# Patient Record
Sex: FEMALE | Race: BLACK OR AFRICAN AMERICAN
[De-identification: names, ages, dates, MRNs, and addresses within clinical notes are randomized per-mention and may not be internally consistent; named-entity substitution may affect disease eponyms.]

---

## 2017-04-11 ENCOUNTER — HOSPITAL ENCOUNTER (OUTPATIENT)
Dept: HOSPITAL 62 - WI | Age: 57
End: 2017-04-11
Attending: INTERNAL MEDICINE
Payer: MEDICARE

## 2017-04-11 DIAGNOSIS — Z12.31: Primary | ICD-10-CM

## 2017-04-11 PROCEDURE — G0202 SCR MAMMO BI INCL CAD: HCPCS

## 2017-04-11 PROCEDURE — 77067 SCR MAMMO BI INCL CAD: CPT

## 2018-06-15 ENCOUNTER — HOSPITAL ENCOUNTER (OUTPATIENT)
Dept: HOSPITAL 62 - WI | Age: 58
End: 2018-06-15
Attending: INTERNAL MEDICINE
Payer: MEDICARE

## 2018-06-15 DIAGNOSIS — Z12.31: Primary | ICD-10-CM

## 2018-06-15 PROCEDURE — 77067 SCR MAMMO BI INCL CAD: CPT

## 2018-06-15 PROCEDURE — 77063 BREAST TOMOSYNTHESIS BI: CPT

## 2018-06-15 NOTE — WOMENS IMAGING REPORT
EXAM DESCRIPTION:  3D SCREENING MAMMO BILAT



COMPLETED DATE/TIME:  6/15/2018 8:51 am



REASON FOR STUDY:  ROUTINE SCREENING;Z12.31 Z12.31  ENCNTR SCREEN MAMMOGRAM FOR MALIGNANT NEOPLASM OF
 BETHANY



COMPARISON:  Multiple since 2013



TECHNIQUE:  Standard craniocaudal and mediolateral oblique views of each breast recorded using digita
l acquisition and breast tomosynthesis.



LIMITATIONS:  None.



FINDINGS:  Findings present which are benign by mammographic criteria.  No suspicious masses, calcifi
cations or architectural distortion.

Pertinent benign findings: Benign left breast parenchymal calcifications

Read with the assistance of CAD.

.[facility] - R2 [employer] Version 1.3

.[facility] - R2 Cenova Version 1.3

.[facility] - R2 Cenova Version 2.4

.[facility] - R2 Cenova Version 2.4

.[facility] - R2  Version 9.2

Benign mammographic findings may include one or more of the following:  Smooth masses, popcorn/rim/co
arse calcifications, asymmetries, post-procedure changes, and lesions with long-standing stability.



IMPRESSION:  BENIGN MAMMOGRAPHIC FINDINGS.  BIRADS 2



BREAST DENSITY:  b. There are scattered areas of fibroglandular density.



BIRAD:  2 BENIGN FINDING(S)



RECOMMENDATION:  RECOMMENDATION: ROUTINE SCREENING

Please continue yearly bilateral screening tomosynthesis in June 2019



COMMENT:  The patient has been notified of the results by letter per SA requirements. Additional no
tification policies are in place for contacting patient with suspicious or incomplete findings.

Quality ID #225: The American College of Radiology recommends an annual screening mammogram for women
 aged 40 years or over. This facility utilizes a reminder system to ensure that all patients receive 
reminder letters, and/or direct phone calls for appointments. This includes reminders for routine scr
eening mammograms, diagnostic mammograms, or other Breast Imaging Interventions when appropriate.  Th
is patient will be placed in the appropriate reminder system.

The American College of Radiology (ACR) has developed recommendations for screening MRI of the breast
s in certain patient populations, to be used in conjunction with mammography.  Breast MRI surveillanc
e may be appropriate for women with more than 20% lifetime risk of developing breast cancer  as deter
mined by genetic testing, significant family history of the disease, or history of mantle radiation f
or Hodgkins Disease.  ACR Practice Guidelines 2008.

[employer]



PQRS 6045F: Fluoroscopic imaging is not utilized for breast tomosynthesis.



TECHNICAL DOCUMENTATION:  FINDING NUMBER: (1)

ASSESSMENT: (1)

JOB ID:  [id number]

 2011 [employer]- All Rights Reserved



Reading location - IP/workstation name: [email or web address]

## 2019-12-11 ENCOUNTER — HOSPITAL ENCOUNTER (OUTPATIENT)
Dept: HOSPITAL 62 - WI | Age: 59
End: 2019-12-11
Attending: INTERNAL MEDICINE
Payer: MEDICARE

## 2019-12-11 DIAGNOSIS — Z12.31: Primary | ICD-10-CM

## 2019-12-11 DIAGNOSIS — N64.89: ICD-10-CM

## 2019-12-11 PROCEDURE — 77063 BREAST TOMOSYNTHESIS BI: CPT

## 2019-12-11 PROCEDURE — 77067 SCR MAMMO BI INCL CAD: CPT

## 2019-12-11 NOTE — WOMENS IMAGING REPORT
EXAM DESCRIPTION:  3D SCREENING MAMMO BILAT



COMPLETED DATE/TIME:  12/11/2019 9:39 am



REASON FOR STUDY:  Z12.31 SCREENING MAMMO Z12.31  ENCNTR SCREEN MAMMOGRAM FOR MALIGNANT NEOPLASM OF B
RE



COMPARISON:  [id number]



EXAM PARAMETERS:  Standard craniocaudal and mediolateral oblique views of each breast recorded using 
digital acquisition and breast tomosynthesis.

Read with the assistance of CAD.

.[facility] - [employer]  Version 9.2



LIMITATIONS:  None.



FINDINGS:  RIGHT BREAST

MASSES: No suspicious masses.

CALCIFICATIONS: No new or suspicious calcifications.

ARCHITECTURAL DISTORTION: None.

ASYMMETRY: None noted.

OTHER: No other significant findings.

LEFT BREAST

MASSES: No suspicious masses.

CALCIFICATIONS: No new or suspicious calcifications.

ARCHITECTURAL DISTORTION: None.

ASYMMETRY: Focal asymmetry lower inner quadrant about 7.5 cm deep to the nipple.

OTHER: No other significant findings.



IMPRESSION:  Focal asymmetry left breast.

0 Incomplete: Needs Additional Imaging Evaluation and/or prior Mammograms for Comparison.



BREAST DENSITY:  b. There are scattered areas of fibroglandular density.



BIRAD:  ASSESSMENT:  0 Incomplete:  Needs Additional Imaging Evaluation and/or prior Mammograms for C
omparison.



RECOMMENDATION:  RECOMMENDED FOLLOW-UP: Cone compression views and potential ultrasound left breast.

The patient will be contacted for additional imaging.



COMMENT:  The patient has been notified of the results by letter per MQSA requirements. Additional no
tification policies are in place for contacting patient with suspicious or incomplete findings.

Quality ID #225: The American College of Radiology recommends an annual screening mammogram for women
 aged 40 years or over. This facility utilizes a reminder system to ensure that all patients receive 
reminder letters, and/or direct phone calls for appointments. This includes reminders for routine scr
eening mammograms, diagnostic mammograms, or other Breast Imaging Interventions when appropriate.  Th
is patient will be placed in the appropriate reminder system.



TECHNICAL DOCUMENTATION:  FINDING NUMBER: (1)

ASSESSMENT: (1)

JOB ID:  [id number]

 2011 [employer]- All Rights Reserved



Reading location - IP/workstation name: [email or web address]

## 2019-12-23 ENCOUNTER — HOSPITAL ENCOUNTER (OUTPATIENT)
Dept: HOSPITAL 62 - WI | Age: 59
End: 2019-12-23
Attending: INTERNAL MEDICINE
Payer: MEDICARE

## 2019-12-23 DIAGNOSIS — N63.24: Primary | ICD-10-CM

## 2019-12-23 PROCEDURE — 76642 ULTRASOUND BREAST LIMITED: CPT

## 2019-12-23 PROCEDURE — 77065 DX MAMMO INCL CAD UNI: CPT

## 2019-12-23 NOTE — WOMENS IMAGING REPORT
EXAM DESCRIPTION:  3D DX MAMMO LEFT UNILAT; U/S BREAST UNILAT LIMITED



COMPLETED DATE/TIME:  12/23/2019 11:39 am; 12/23/2019 12:06 pm



REASON FOR STUDY:  R92.2 INCONCLUSIVE MAMMOGRAM; R92.2 LT BREAST R92.2  INCONCLUSIVE MAMMOGRAM



COMPARISON:  Multiple since 2015



EXAM PARAMETERS:  Cone compression craniocaudal and mediolateral oblique images of the breast recorde
d using digital acquisition and breast tomosynthesis.  Additional left breast 90 mediolateral view.

Left breast and axilla ultrasound was performed.

Read with the assistance of CAD.

.[facility] - [facility]  Version 9.2



LIMITATIONS:  None.



FINDINGS:  BREAST LATERALITY: left

MASSES: In the lower inner quadrant left breast about 5 cm from the nipple, a subcentimeter mammograp
hic mass is present with partial border loss.  This was subsequently shown at ultrasound to represent
 a small solid hypoechoic mass 4 mm in size indeterminate for malignancy.

CALCIFICATIONS: No new or suspicious calcifications.

ARCHITECTURAL DISTORTION: None.

ASYMMETRY: None noted.

OTHER: No other significant findings.

Left breast and axilla ultrasound:

Ultrasound of the lower inner quadrant left breast demonstrates a solid hypoechoic 4 mm by 6 mm mammo
graphic nodule, taller than wide, with internal color flow.  This is worrisome for a small malignant 
nodule.  Ultrasound-guided core biopsy, post biopsy clip placement and immediate follow-up two-view m
ammogram recommended.



IMPRESSION:  New solid mass lower inner quadrant left breast 6 x 4 mm in size indeterminate for malig
romi.  Biopsy recommended (BI-RADS 4).



BREAST DENSITY:  b. There are scattered areas of fibroglandular density.



BIRAD:  ASSESSMENT:  4 Suspicious. Biopsy should be performed in the absence of clinical contra-indic
ation.



RECOMMENDATION:  RECOMMENDED FOLLOW UP: Ultrasound-guided core biopsy left breast, post biopsy clip p
lacement with immediate follow-up two-view mammogram

SPECIFIC INTERVENTION/IMAGING/CONSULTATION RECOMMENDED:Ultrasound-guided core biopsy left breast, pos
t biopsy clip placement with immediate follow-up two-view mammogram

COMMUNICATION:Patient notified by letter.  These results were not discussed directly with the patient




COMMENT:  The patient has been notified of the results by letter per MQSA requirements. Additional no
tification policies are in place for contacting patient with suspicious or incomplete findings.

Quality ID #225: The American College of Radiology recommends an annual screening mammogram for women
 aged 40 years or over. This facility utilizes a reminder system to ensure that all patients receive 
reminder letters, and/or direct phone calls for appointments. This includes reminders for routine scr
eening mammograms, diagnostic mammograms, or other Breast Imaging Interventions when appropriate.  Th
is patient will be placed in the appropriate reminder system.



TECHNICAL DOCUMENTATION:  FINDING NUMBER: (1)

ASSESSMENT: (1)

JOB ID:  [id number]

 2011 [employer]- All Rights Reserved



Reading location - IP/workstation name: [email or web address]

## 2019-12-23 NOTE — WOMENS IMAGING REPORT
EXAM DESCRIPTION:  3D DX MAMMO LEFT UNILAT; U/S BREAST UNILAT LIMITED



COMPLETED DATE/TIME:  12/23/2019 11:39 am; 12/23/2019 12:06 pm



REASON FOR STUDY:  R92.2 INCONCLUSIVE MAMMOGRAM; R92.2 LT BREAST R92.2  INCONCLUSIVE MAMMOGRAM



COMPARISON:  Multiple since 2015



EXAM PARAMETERS:  Cone compression craniocaudal and mediolateral oblique images of the breast recorde
d using digital acquisition and breast tomosynthesis.  Additional left breast 90 mediolateral view.

Left breast and axilla ultrasound was performed.

Read with the assistance of CAD.

.[facility] [employer]  Version 9.2



LIMITATIONS:  None.



FINDINGS:  BREAST LATERALITY: left

MASSES: In the lower inner quadrant left breast about 5 cm from the nipple, a subcentimeter mammograp
hic mass is present with partial border loss.  This was subsequently shown at ultrasound to represent
 a small solid hypoechoic mass 4 mm in size indeterminate for malignancy.

CALCIFICATIONS: No new or suspicious calcifications.

ARCHITECTURAL DISTORTION: None.

ASYMMETRY: None noted.

OTHER: No other significant findings.

Left breast and axilla ultrasound:

Ultrasound of the lower inner quadrant left breast demonstrates a solid hypoechoic 4 mm by 6 mm mammo
graphic nodule, taller than wide, with internal color flow.  This is worrisome for a small malignant 
nodule.  Ultrasound-guided core biopsy, post biopsy clip placement and immediate follow-up two-view m
ammogram recommended.



IMPRESSION:  New solid mass lower inner quadrant left breast 6 x 4 mm in size indeterminate for malig
romi.  Biopsy recommended (BI-RADS 4).



BREAST DENSITY:  b. There are scattered areas of fibroglandular density.



BIRAD:  ASSESSMENT:  4 Suspicious. Biopsy should be performed in the absence of clinical contra-indic
ation.



RECOMMENDATION:  RECOMMENDED FOLLOW UP: Ultrasound-guided core biopsy left breast, post biopsy clip p
lacement with immediate follow-up two-view mammogram

SPECIFIC INTERVENTION/IMAGING/CONSULTATION RECOMMENDED:Ultrasound-guided core biopsy left breast, pos
t biopsy clip placement with immediate follow-up two-view mammogram

COMMUNICATION:Patient notified by letter.  These results were not discussed directly with the patient




COMMENT:  The patient has been notified of the results by letter per MQSA requirements. Additional no
tification policies are in place for contacting patient with suspicious or incomplete findings.

Quality ID #225: The American College of Radiology recommends an annual screening mammogram for women
 aged 40 years or over. This facility utilizes a reminder system to ensure that all patients receive 
reminder letters, and/or direct phone calls for appointments. This includes reminders for routine scr
eening mammograms, diagnostic mammograms, or other Breast Imaging Interventions when appropriate.  Th
is patient will be placed in the appropriate reminder system.



TECHNICAL DOCUMENTATION:  FINDING NUMBER: (1)

ASSESSMENT: (1)

JOB ID:  [id number]

 2011 [employer]- All Rights Reserved



Reading location - IP/workstation name: [email or web address]

## 2020-02-12 ENCOUNTER — HOSPITAL ENCOUNTER (OUTPATIENT)
Dept: HOSPITAL 62 - RAD | Age: 60
End: 2020-02-12
Attending: SURGERY
Payer: MEDICARE

## 2020-02-12 DIAGNOSIS — E11.9: ICD-10-CM

## 2020-02-12 DIAGNOSIS — I10: ICD-10-CM

## 2020-02-12 DIAGNOSIS — Z79.84: ICD-10-CM

## 2020-02-12 DIAGNOSIS — K74.60: ICD-10-CM

## 2020-02-12 DIAGNOSIS — I25.10: ICD-10-CM

## 2020-02-12 DIAGNOSIS — C50.912: Primary | ICD-10-CM

## 2020-02-12 DIAGNOSIS — G62.9: ICD-10-CM

## 2020-02-12 DIAGNOSIS — Z79.899: ICD-10-CM

## 2020-02-12 PROCEDURE — 19081 BX BREAST 1ST LESION STRTCTC: CPT

## 2020-02-12 PROCEDURE — 77065 DX MAMMO INCL CAD UNI: CPT

## 2020-02-12 PROCEDURE — 88305 TISSUE EXAM BY PATHOLOGIST: CPT

## 2020-02-12 PROCEDURE — 88342 IMHCHEM/IMCYTCHM 1ST ANTB: CPT

## 2020-02-12 NOTE — DISCHARGE SUMMARY
Discharge Summary (SDC)





- Discharge


Final Diagnosis: 





Asymmetric nodular density left breast


Date of Surgery: 02/12/20


Discharge Date: 02/12/20


Condition: Good


Treatment or Instructions: 


Use supportive bra; resume preoperative medications, diet; no heavy physical 

activity; follow-up with [facility] Dr. Lawson, in 1 to 2 weeks; 

may take home pain medications; may shower in 24 hours


Referrals: 


MELISSA PIERCE MD [Primary Care Provider] - 


Discharge Diet: As Tolerated


Discharge Activity: Activity As Tolerated


Home Care Assistance: None Needed


Report the Following to Your Physician Immediately: Shortness of Breath, 

Increase in Pain, Fever over 101 Degrees

## 2020-02-12 NOTE — OPERATIVE REPORT
Operative Report


DATE OF SURGERY: 02/12/20


PREOPERATIVE DIAGNOSIS: Asymmetric nodular density left breast


POSTOPERATIVE DIAGNOSIS: Same


OPERATION: 1.  Stereotactically directed incision mammotomy core biopsies left 

breast nodule.  2.  Placement of clip marker into biopsy cavity left breast.  3.

 Interpretation of intraoperative mammograms


SURGEON: QUYNH CHUNG


ANESTHESIA: Local


TISSUE REMOVED OR ALTERED: Multiple coarse left breast


COMPLICATIONS: 





None


ESTIMATED BLOOD LOSS: Scant


INTRAOPERATIVE FINDINGS: See below


PROCEDURE: 





The patient was taken from the radiologic department holding area to the 

stereotactic room where she was placed in the prone position, left breast placed

into compression in a craniocaudal configuration.  The target asymmetric nodular

density was localized.  Stereotactic views were obtained.





Surgical plan surgical timeout were conducted.





The inferior surface of the left breast was prepped with Betadine, skin 

anesthetized 1% plain lidocaine.  A small nick was made in the skin with 11 

blade, mammotome advanced to the appropriate depth.  Pre-and post fire films 

showed good alignment between the mammotome, and the target asymmetric nodular 

density.





We now completed core biopsy in a circumferential fashion of the target nodule. 

Specimens were collected on a Marco Antonio dish and sent to pathology for permanent 

analysis.





A clip marker was then deployed into the biopsy cavity, post deployment images 

showed retention of a marker in the left breast.  Introducer removed from the 

left breast.  Gentle compression applied.  Patient tolerated the procedure well.

 Patient was subsequently taken to the imaging suite for completion post biopsy 

mammogram.





Patient tolerated the procedure well.  Discharge instructions provided.  She 

will follow-up with Dr. holland in 1 to 2 weeks.  We will call her with the 

results of the path report.

## 2020-02-17 NOTE — RADIOLOGY REPORT (SQ)
EXAM DESCRIPTION:  STEREO BREAST BX; LEFT DIAGNOSTIC MAMMO W/CAD



COMPLETED DATE/TIME:  2/12/2020 12:32 pm; 2/12/2020 10:55 am



REASON FOR STUDY:  LEFT BREAST MASS (N63.24); POST STEREO; N63.24 LEFT BREAST N63.24  UNSPECIFIED LUM
P IN THE LEFT BREAST, LOWER INNER QUAD



COMPARISON:  12/23/2019



LIMITATIONS:  None.



PROCEDURE:  Vacuum-assisted stereotactic-guided biopsy of the lesion in the left breast targeted and 
performed by Dr. Lawson, the operating surgeon.  Procedure and post-procedure imaging interpreted b
y a radiologist.

Using stereotactic guidance, a vacuum-assisted  core biopsy of the targeted lesion was performed. A p
ellet clip was deployed at the biopsy site.  Post procedure image reveals the clip at the biopsy site
.



TECHNIQUE:  Images from the stereotactic unit acquired during the procedure.

Specimen radiography performed. Yes

Post- procedure image acquired post-clip placement. Yes

Post procedure 2 view mammograms performed in the mammography suite for clip placement.  Yes



FINDINGS:  SPECIMEN RADIOGRAPH:Solid tumor identified.

POST PROCEDURE MAMMOGRAMS FOR MARKER PLACEMENT: Yes

POST PROCEDURE MAMMOGRAM: Clip is in expected location.  No significant hematoma.

PATHOLOGY: Invasive ductal carcinoma

CONCORDANT: Yes. The operating surgeon was notified of the  findings.



IMPRESSION:  SUCCESSFUL STEREOTACTIC-GUIDED BIOPSY OF LESION  IN THE

LEFT BREAST.

BIOPSY RESULTS ARE CONCORDANT WITH IMAGING  FINDINGS.

FOLLOW-UP: BI-RADS 6, known malignancy, appropriate action should be taken.



TECHNICAL DOCUMENTATION:  JOB ID:  [id number]

 2011 [employer]- All Rights Reserved



Reading location - IP/workstation name: [facility]

## 2020-02-17 NOTE — WOMENS IMAGING REPORT
EXAM DESCRIPTION:  STEREO BREAST BX; LEFT DIAGNOSTIC MAMMO W/CAD



COMPLETED DATE/TIME:  2/12/2020 12:32 pm; 2/12/2020 10:55 am



REASON FOR STUDY:  LEFT BREAST MASS (N63.24); POST STEREO; N63.24 LEFT BREAST N63.24  UNSPECIFIED LUM
P IN THE LEFT BREAST, LOWER INNER QUAD



COMPARISON:  12/23/2019



LIMITATIONS:  None.



PROCEDURE:  Vacuum-assisted stereotactic-guided biopsy of the lesion in the left breast targeted and 
performed by Dr. Lawson, the operating surgeon.  Procedure and post-procedure imaging interpreted b
y a radiologist.

Using stereotactic guidance, a vacuum-assisted  core biopsy of the targeted lesion was performed. A p
ellet clip was deployed at the biopsy site.  Post procedure image reveals the clip at the biopsy site
.



TECHNIQUE:  Images from the stereotactic unit acquired during the procedure.

Specimen radiography performed. Yes

Post- procedure image acquired post-clip placement. Yes

Post procedure 2 view mammograms performed in the mammography suite for clip placement.  Yes



FINDINGS:  SPECIMEN RADIOGRAPH:Solid tumor identified.

POST PROCEDURE MAMMOGRAMS FOR MARKER PLACEMENT: Yes

POST PROCEDURE MAMMOGRAM: Clip is in expected location.  No significant hematoma.

PATHOLOGY: Invasive ductal carcinoma

CONCORDANT: Yes. The operating surgeon was notified of the  findings.



IMPRESSION:  SUCCESSFUL STEREOTACTIC-GUIDED BIOPSY OF LESION  IN THE

LEFT BREAST.

BIOPSY RESULTS ARE CONCORDANT WITH IMAGING  FINDINGS.

FOLLOW-UP: BI-RADS 6, known malignancy, appropriate action should be taken.



TECHNICAL DOCUMENTATION:  JOB ID:  [id number]

 2011 [employer]- All Rights Reserved



Reading location - IP/workstation name: [facility]